# Patient Record
Sex: MALE | URBAN - NONMETROPOLITAN AREA
[De-identification: names, ages, dates, MRNs, and addresses within clinical notes are randomized per-mention and may not be internally consistent; named-entity substitution may affect disease eponyms.]

---

## 2018-03-29 PROBLEM — H52.11: Noted: 2018-03-29

## 2018-03-29 PROBLEM — H52.222: Noted: 2018-03-29

## 2018-03-29 PROBLEM — H52.4: Noted: 2018-03-29

## 2018-09-26 NOTE — PATIENT DISCUSSION
AMD (DRY), OD: NEWLY DIAGNOSED TODAY. PRESCRIBE AREDS 2 VITAMINS / AMSLER GRID DAILY / UV PROTECTION. SMOKING AVOIDANCE ADVISED. RETURN FOR FOLLOW-UP AS SCHEDULED. REFER TO DR. Elvin Raines.

## 2019-03-14 NOTE — PATIENT DISCUSSION
Discussed pathophysiology of drusen and possibility of development of further macular changes that may ultimately constitute macular degeneration. Due to few number of small drusen present, vitamin supplementation would be considered optional, without proven benefit at this stage. Avoid smoking. Eat a diet rich in vegetable and use sunglasses for UV protection when outdoors.

## 2019-03-14 NOTE — PATIENT DISCUSSION
MACULAR DRUSEN, OD: NO DEFINITE SIGNS OF AMD OU; RECOMMEND OBSERVATION ONLY AT THIS TIME YEARLY DILATED EXAM WITH DR CAT. RETURN TO DR Elmo Purvis AS NEEDED.

## 2019-10-23 NOTE — PATIENT DISCUSSION
DERMATOCHALASIS / PTOSIS RUL AND JUVENAL :  VISUALLY SIGNIFICANT TO PATIENT. SCHEDULE WITH OCULOPLASTIC SPECIALIST IF PATIENT DESIRES.

## 2020-10-28 NOTE — PATIENT DISCUSSION
**MILD TO MODERATE DRY EYE, OU: PRESCRIBED ARTIFICIAL TEARS 2-3 X A DAY OU. CONTINUE OTC ALLERGY DROP PRN FOR ANY ITCHING. RECOMMENDS OMEGA-3 FISH OIL WITH PRIMARY CARE PHYSICIANS APPROVAL. RETURN FOR FOLLOW-UP AS SCHEDULED OR SOONER IF SYMPTOMS WORSEN.

## 2021-06-18 NOTE — PATIENT DISCUSSION
6/18/2021 at 0850 a.m. right chest implanted port accessed using a 20 gauge 3/4 inch non-coring needle primed with 0.9% sodium chloride.  Good blood return obtained.  Blood obtained and sent to lab. Flushed with 20ml 0.9% sodium chloride. Implanted port site is not swollen and not warm.  Patient tolerated procedure well. Pt discharged to RN.     MACULAR DRUSEN, OU: CONTINUE AREDS2 AND INCREASED UV PROTECTION. STRESS GOOD DIET AND NO SMOKING. RETURN FOR FOLLOW-UP AS SCHEDULED.

## 2021-09-08 ENCOUNTER — IMPORTED ENCOUNTER (OUTPATIENT)
Dept: URBAN - NONMETROPOLITAN AREA CLINIC 1 | Facility: CLINIC | Age: 56
End: 2021-09-08

## 2021-09-08 PROCEDURE — 92004 COMPRE OPH EXAM NEW PT 1/>: CPT

## 2021-09-08 PROCEDURE — 92015 DETERMINE REFRACTIVE STATE: CPT

## 2021-09-08 NOTE — PATIENT DISCUSSION
Myopia-Discussed diagnosis with patient. -Explained that people who are myopic are at a higher risk for developing RD/RT and reviewed associated S&S.-Pt to contact our office if symptoms develop. Updated spec Rx given. Recommend lens that will provide comfort as well as protect safety and health of eyes. Presbyopia-Discussed diagnosis with patient.

## 2021-11-08 NOTE — PATIENT DISCUSSION
MACULAR DRUSEN, OU: CONTINUE AREDS2 AND INCREASED UV PROTECTION. STRESS GOOD DIET AND NO SMOKING. RETURN FOR FOLLOW-UP AS SCHEDULED.

## 2021-11-08 NOTE — PATIENT DISCUSSION
Dermatochalasis/ Ptosis Counseling: I have explained the diagnosis of dermatochalasis/ptosis to the patient. The resulting symptoms, including but not limited to visual field deficits, blepharitis, and/or dry eye symptoms from eyelid wick syndrome, that may result if left untreated were discussed with the patient. The patient understands that intervention is elective surgery and consultation with an oculoplastic specialist can be scheduled at their convenience.

## 2021-11-08 NOTE — PATIENT DISCUSSION
Dry Eye Syndrome Counseling: I have discussed the diagnosis and the pathophysiology of this disease with the patient. Vision may be limited by dry eye, and symptoms exacerbated by environmental factors such as smoke, wind, or prolonged eye use. Treatment options include, but are not limited to, artificial tears, punctal plugs, topical cyclosporine, oral omega-3 supplements, Lipiflow, moisture goggles, and lubricating ointments. I stressed the importance of compliance with treatment.

## 2021-11-08 NOTE — PATIENT DISCUSSION
MILD TO MODERATE DRY EYE, OU: PRESCRIBED ARTIFICIAL TEARS 2-3 X A DAY OU. CONTINUE OTC ALLERGY DROP PRN FOR ANY ITCHING. RECOMMENDS OMEGA-3 FISH OIL WITH PRIMARY CARE PHYSICIANS APPROVAL. RETURN FOR FOLLOW-UP AS SCHEDULED OR SOONER IF SYMPTOMS WORSEN.

## 2022-04-10 ASSESSMENT — TONOMETRY
OS_IOP_MMHG: 16
OD_IOP_MMHG: 16

## 2022-04-10 ASSESSMENT — VISUAL ACUITY
OU_CC: 20/25-1
OS_SC: 20/30
OD_CC: 20/40
OU_SC: 20/30
OS_CC: 20/25+3
OD_SC: 20/30

## 2023-04-26 ENCOUNTER — OFFICE VISIT (OUTPATIENT)
Dept: PRIMARY CARE CLINIC | Age: 58
End: 2023-04-26
Payer: COMMERCIAL

## 2023-04-26 VITALS
HEART RATE: 70 BPM | WEIGHT: 245 LBS | DIASTOLIC BLOOD PRESSURE: 90 MMHG | SYSTOLIC BLOOD PRESSURE: 132 MMHG | OXYGEN SATURATION: 100 % | HEIGHT: 77 IN | RESPIRATION RATE: 16 BRPM | BODY MASS INDEX: 28.93 KG/M2 | TEMPERATURE: 97.5 F

## 2023-04-26 DIAGNOSIS — Z12.5 SCREENING FOR MALIGNANT NEOPLASM OF PROSTATE: Primary | ICD-10-CM

## 2023-04-26 DIAGNOSIS — R03.0 ELEVATED BLOOD PRESSURE READING: ICD-10-CM

## 2023-04-26 DIAGNOSIS — Z13.1 SCREENING FOR DIABETES MELLITUS: ICD-10-CM

## 2023-04-26 DIAGNOSIS — Z13.220 SCREENING FOR HYPERLIPIDEMIA: ICD-10-CM

## 2023-04-26 PROCEDURE — 99203 OFFICE O/P NEW LOW 30 MIN: CPT | Performed by: FAMILY MEDICINE

## 2023-04-26 NOTE — PROGRESS NOTES
Chief Complaint   Patient presents with    Establish Care       There were no vitals taken for this visit. 1. Have you been to the ER, urgent care clinic since your last visit? Hospitalized since your last visit? No    2. Have you seen or consulted any other health care providers outside of the [address] [place] [address] since your last visit? Include any pap smears or colon screening. No      3. For patients aged 39-70: Has the patient had a colonoscopy / FIT/ Cologuard? Yes - Care Gap present. Rooming MA/LPN to request most recent results      If the patient is female:    4. For patients aged 41-77: Has the patient had a mammogram within the past 2 years? NA - based on age or sex      11. For patients aged 21-65: Has the patient had a pap smear?  NA - based on age or sex

## 2023-04-26 NOTE — PROGRESS NOTES
HPI     Chief Complaint   Patient presents with    [facility] Care     He is a 65 y.o. male who presents to [facility]. Establishing Care    Pmhx : BPH. Surghx reviewed. Fhx reviewed. Sochx : denies tobacco, drug or alcohol use. Diet is average. He has cut out red meat. He is not exercising regularly, but plans to restart this. Enjoys playing basketball on the weekend. Tolerates exercise well. Bph. Follows with urology. Notes home bp have been mildly elevated, DBP has been trending about 90 at home. - Chronic medical problems:  Past Medical History:   Diagnosis Date    BPH with obstruction/lower urinary tract symptoms     Elevated PSA     Nocturia     Slowing of urinary stream     Sterilization consult     Urgency of urination      - Current medications:   Current Outpatient Medications   Medication Sig    cholecalciferol (VITAMIN D3) (1000 Units /25 mcg) tablet Take 1 Tablet by mouth daily. therapeutic multivitamin (THERAGRAN) tablet Take 1 Tablet by mouth daily. tamsulosin (FLOMAX) 0.4 mg capsule Take 1 Capsule by mouth daily (after dinner). Indications: enlarged prostate with urination problem    omega 3-dha-epa-fish oil 60- mg cap Take 500 mg by mouth daily. (Patient not taking: Reported on 4/26/2023)     No current facility-administered medications for this visit.      - Family history:   Family History   Problem Relation Age of Onset    Mult Sclerosis Mother      - Allergies: No Known Allergies  - Surgical history:   Past Surgical History:   Procedure Laterality Date    HX COLONOSCOPY      HX OTHER SURGICAL  09/01/2019    Incision & drainage hematoma (Left) Procedure: INCISION AND DRAINAGE DEEP HEMATOMA UPPER ARM/ ELBOW;  Surgeon: Marleen Vegas MD    HX VASECTOMY  5-2008     - Social history (sexually active, occupation, smoker, etoh use, etc):   Social History     Socioeconomic History    Marital status:      Spouse name: Not on file    Number of children: Not on file    Years of education: Not on file    Highest education level: Not on file   Occupational History    Not on file   Tobacco Use    Smoking status: Never    Smokeless tobacco: Never   Substance and Sexual Activity    Alcohol use: Not Currently    Drug use: Never    Sexual activity: Yes     Partners: Female   Other Topics Concern    Not on file   Social History Narrative    Not on file     Social Determinants of Health     Financial Resource Strain: Unknown    Difficulty of Paying Living Expenses: Patient refused   Food Insecurity: Unknown    Worried About Running Out of Food in the Last Year: Patient refused    Ran Out of Food in the Last Year: Patient refused   Transportation Needs: Not on file   Physical Activity: Insufficiently Active    Days of Exercise per Week: 5 days    Minutes of Exercise per Session: 20 min   Stress: Not on file   Social Connections: Not on file   Intimate Partner Violence: Not At Risk    Fear of Current or Ex-Partner: No    Emotionally Abused: No    Physically Abused: No    Sexually Abused: No   Housing Stability: Not on file         Review of Systems  General : Denies fever, chills, unintentional weight loss. Cardiac : Denies chest pain, shortness of breath, orthopnea, PND. Pulm : Denies coughing, hemoptysis, dyspnea. GI: Denies abd pain, vomiting, change in bowel movements, black/bloody stool. Neuro : Denies syncope or presyncope. Denies focal neuro symptoms. Reviewed PmHx, RxHx, FmHx, SocHx, AllgHx and updated and dated in the chart. Physical Exam:  Visit Vitals  BP (!) 132/90 (BP 1 Location: Left upper arm, BP Patient Position: Sitting)   Pulse 70   Temp 97.5 °F (36.4 °C) (Temporal)   Resp 16   Ht 6' 5\" (1.956 m)   Wt 245 lb (111.1 kg)   SpO2 100%   BMI 29.05 kg/m²     Physical Exam  Vitals reviewed. Constitutional:       Appearance: Normal appearance. HENT:      Head: Normocephalic and atraumatic.       Right Ear: Tympanic membrane, ear canal and external ear normal.      Left Ear: Tympanic membrane, ear canal and external ear normal.      Mouth/Throat:      Mouth: Mucous membranes are moist.      Pharynx: Oropharynx is clear. Eyes:      Conjunctiva/sclera: Conjunctivae normal.      Pupils: Pupils are equal, round, and reactive to light. Cardiovascular:      Rate and Rhythm: Normal rate and regular rhythm. Pulses: Normal pulses. Heart sounds: Normal heart sounds. Pulmonary:      Effort: Pulmonary effort is normal.      Breath sounds: Normal breath sounds. Abdominal:      General: Bowel sounds are normal. There is no distension. Palpations: Abdomen is soft. There is no mass. Tenderness: There is no abdominal tenderness. Musculoskeletal:      Right lower leg: No edema. Left lower leg: No edema. Skin:     General: Skin is warm and dry. Neurological:      General: No focal deficit present. Mental Status: He is alert and oriented to person, place, and time. Psychiatric:         Mood and Affect: Mood normal.         Behavior: Behavior normal.         Thought Content: Thought content normal.          Assessment / Plan     Diagnoses and all orders for this visit:    1. Screening for malignant neoplasm of prostate  -     PSA SCREENING (SCREENING); Future    2. Screening for hyperlipidemia  -     METABOLIC PANEL, COMPREHENSIVE; Future  -     LIPID PANEL; Future    3. Screening for diabetes mellitus  -     METABOLIC PANEL, COMPREHENSIVE; Future    4. Elevated blood pressure reading  -     METABOLIC PANEL, COMPREHENSIVE; Future  -     LIPID PANEL; Future    Encouraged hh diet and regular exercise. Monitor bp at home. Bp goals discussed. He prefers to work on lifestyle modifications and monitor bp at home. Follow up if not controlled within 1 month. Advised shingles vaccine at local pharmacy. Request colonoscopy report; reports hx of normal screening colonoscopy but can't remember date.        Obtain medical records    I have discussed the diagnosis with the patient and the intended plan as seen in the above orders. I have discussed medication side effects and warnings with the patient as well.     Zenia Vital, DO

## 2023-04-27 LAB
ALBUMIN SERPL-MCNC: 4.5 G/DL (ref 3.8–4.9)
ALBUMIN/GLOB SERPL: 1.9 {RATIO} (ref 1.2–2.2)
ALP SERPL-CCNC: 49 IU/L (ref 44–121)
ALT SERPL-CCNC: 27 IU/L (ref 0–44)
AST SERPL-CCNC: 22 IU/L (ref 0–40)
BILIRUB SERPL-MCNC: 1 MG/DL (ref 0–1.2)
BUN SERPL-MCNC: 18 MG/DL (ref 6–24)
BUN/CREAT SERPL: 17 (ref 9–20)
CALCIUM SERPL-MCNC: 9.6 MG/DL (ref 8.7–10.2)
CHLORIDE SERPL-SCNC: 105 MMOL/L (ref 96–106)
CHOLEST SERPL-MCNC: 167 MG/DL (ref 100–199)
CO2 SERPL-SCNC: 25 MMOL/L (ref 20–29)
CREAT SERPL-MCNC: 1.09 MG/DL (ref 0.76–1.27)
EGFRCR SERPLBLD CKD-EPI 2021: 80 ML/MIN/1.73
GLOBULIN SER CALC-MCNC: 2.4 G/DL (ref 1.5–4.5)
GLUCOSE SERPL-MCNC: 103 MG/DL (ref 70–99)
HDLC SERPL-MCNC: 49 MG/DL
LDLC SERPL CALC-MCNC: 106 MG/DL (ref 0–99)
POTASSIUM SERPL-SCNC: 4.3 MMOL/L (ref 3.5–5.2)
PROT SERPL-MCNC: 6.9 G/DL (ref 6–8.5)
PSA SERPL-MCNC: 3.4 NG/ML (ref 0–4)
SODIUM SERPL-SCNC: 144 MMOL/L (ref 134–144)
TRIGL SERPL-MCNC: 61 MG/DL (ref 0–149)
VLDLC SERPL CALC-MCNC: 12 MG/DL (ref 5–40)

## 2023-05-17 ENCOUNTER — TELEPHONE (OUTPATIENT)
Dept: PRIMARY CARE CLINIC | Facility: CLINIC | Age: 58
End: 2023-05-17

## 2023-05-17 NOTE — TELEPHONE ENCOUNTER
Patient says he and Dr Ngoc Sofia talked about taking Flomax when he saw her but the prescription was never ordered. He needs Flomax and twice a day. Please send to [facility].

## 2023-05-18 RX ORDER — TAMSULOSIN HYDROCHLORIDE 0.4 MG/1
0.4 CAPSULE ORAL 2 TIMES DAILY
Qty: 180 CAPSULE | Refills: 1 | Status: SHIPPED | OUTPATIENT
Start: 2023-05-18

## 2023-05-18 RX ORDER — TAMSULOSIN HYDROCHLORIDE 0.4 MG/1
0.4 CAPSULE ORAL 2 TIMES DAILY
Qty: 180 CAPSULE | Refills: 1 | Status: SHIPPED | OUTPATIENT
Start: 2023-05-18 | End: 2023-05-18 | Stop reason: SDUPTHER

## 2023-11-14 RX ORDER — TAMSULOSIN HYDROCHLORIDE 0.4 MG/1
CAPSULE ORAL
Qty: 180 CAPSULE | Refills: 0 | Status: SHIPPED | OUTPATIENT
Start: 2023-11-14

## 2024-03-11 RX ORDER — TAMSULOSIN HYDROCHLORIDE 0.4 MG/1
CAPSULE ORAL
Qty: 180 CAPSULE | Refills: 0 | Status: SHIPPED | OUTPATIENT
Start: 2024-03-11

## 2024-05-09 ENCOUNTER — COMMUNITY OUTREACH (OUTPATIENT)
Dept: PRIMARY CARE CLINIC | Facility: CLINIC | Age: 59
End: 2024-05-09

## 2024-06-06 DIAGNOSIS — Z12.5 ENCOUNTER FOR SCREENING FOR MALIGNANT NEOPLASM OF PROSTATE: Primary | ICD-10-CM

## 2024-06-06 RX ORDER — TAMSULOSIN HYDROCHLORIDE 0.4 MG/1
CAPSULE ORAL
Qty: 180 CAPSULE | Refills: 0 | OUTPATIENT
Start: 2024-06-06

## 2024-06-06 RX ORDER — TAMSULOSIN HYDROCHLORIDE 0.4 MG/1
CAPSULE ORAL
Qty: 60 CAPSULE | Refills: 0 | Status: SHIPPED | OUTPATIENT
Start: 2024-06-06

## 2024-06-06 NOTE — TELEPHONE ENCOUNTER
Patient calling about his tamsulosin being denied do to appointment. Explain to the patient that I can scheduled him appointment and see if Dr. Kirk will send in short supply until his appointment with her on June 28th. Patient would like a phone call if medication can be refill until appointment.

## 2024-06-28 ENCOUNTER — OFFICE VISIT (OUTPATIENT)
Dept: PRIMARY CARE CLINIC | Facility: CLINIC | Age: 59
End: 2024-06-28
Payer: COMMERCIAL

## 2024-06-28 VITALS
WEIGHT: 238 LBS | BODY MASS INDEX: 28.1 KG/M2 | TEMPERATURE: 97.1 F | DIASTOLIC BLOOD PRESSURE: 85 MMHG | OXYGEN SATURATION: 96 % | HEIGHT: 77 IN | SYSTOLIC BLOOD PRESSURE: 121 MMHG | RESPIRATION RATE: 12 BRPM | HEART RATE: 61 BPM

## 2024-06-28 DIAGNOSIS — E78.2 MIXED HYPERLIPIDEMIA: ICD-10-CM

## 2024-06-28 DIAGNOSIS — M54.42 CHRONIC MIDLINE LOW BACK PAIN WITH LEFT-SIDED SCIATICA: ICD-10-CM

## 2024-06-28 DIAGNOSIS — R73.9 ELEVATED BLOOD SUGAR: ICD-10-CM

## 2024-06-28 DIAGNOSIS — G89.29 CHRONIC MIDLINE LOW BACK PAIN WITH LEFT-SIDED SCIATICA: ICD-10-CM

## 2024-06-28 DIAGNOSIS — R20.2 PARESTHESIA: ICD-10-CM

## 2024-06-28 DIAGNOSIS — Z00.00 ROUTINE GENERAL MEDICAL EXAMINATION AT A HEALTH CARE FACILITY: ICD-10-CM

## 2024-06-28 DIAGNOSIS — Z12.11 SCREEN FOR COLON CANCER: Primary | ICD-10-CM

## 2024-06-28 PROCEDURE — 99396 PREV VISIT EST AGE 40-64: CPT | Performed by: FAMILY MEDICINE

## 2024-06-28 RX ORDER — TRIAMCINOLONE ACETONIDE 1 MG/G
OINTMENT TOPICAL 2 TIMES DAILY
Qty: 15 G | Refills: 0 | Status: SHIPPED | OUTPATIENT
Start: 2024-06-28 | End: 2024-07-05

## 2024-06-28 RX ORDER — VIT B COMP NO.3/FOLIC/C/BIOTIN 1 MG-60 MG
1 TABLET ORAL
COMMUNITY

## 2024-06-28 SDOH — ECONOMIC STABILITY: FOOD INSECURITY: WITHIN THE PAST 12 MONTHS, YOU WORRIED THAT YOUR FOOD WOULD RUN OUT BEFORE YOU GOT MONEY TO BUY MORE.: NEVER TRUE

## 2024-06-28 SDOH — ECONOMIC STABILITY: HOUSING INSECURITY
IN THE LAST 12 MONTHS, WAS THERE A TIME WHEN YOU DID NOT HAVE A STEADY PLACE TO SLEEP OR SLEPT IN A SHELTER (INCLUDING NOW)?: NO

## 2024-06-28 SDOH — ECONOMIC STABILITY: FOOD INSECURITY: WITHIN THE PAST 12 MONTHS, THE FOOD YOU BOUGHT JUST DIDN'T LAST AND YOU DIDN'T HAVE MONEY TO GET MORE.: NEVER TRUE

## 2024-06-28 SDOH — ECONOMIC STABILITY: INCOME INSECURITY: HOW HARD IS IT FOR YOU TO PAY FOR THE VERY BASICS LIKE FOOD, HOUSING, MEDICAL CARE, AND HEATING?: NOT HARD AT ALL

## 2024-06-28 ASSESSMENT — PATIENT HEALTH QUESTIONNAIRE - PHQ9
SUM OF ALL RESPONSES TO PHQ QUESTIONS 1-9: 0
SUM OF ALL RESPONSES TO PHQ QUESTIONS 1-9: 0
2. FEELING DOWN, DEPRESSED OR HOPELESS: NOT AT ALL
SUM OF ALL RESPONSES TO PHQ QUESTIONS 1-9: 0
SUM OF ALL RESPONSES TO PHQ QUESTIONS 1-9: 0
SUM OF ALL RESPONSES TO PHQ9 QUESTIONS 1 & 2: 0
1. LITTLE INTEREST OR PLEASURE IN DOING THINGS: NOT AT ALL

## 2024-06-28 NOTE — PROGRESS NOTES
\"Have you been to the ER, urgent care clinic since your last visit?  Hospitalized since your last visit?\"    NO    “Have you seen or consulted any other health care providers outside of [facility] since your last visit?”    NO      “Have you had a colorectal cancer screening such as a colonoscopy/FIT/Cologuard?    NO pt states that he is unsure but it has been over 5 years it was in [place]    No colonoscopy on file  No cologuard on file  No FIT/FOBT on file   No flexible sigmoidoscopy on file         Click Here for Release of Records Request   
agrees with the plan. I have discussed medication side effects and warnings with the patient as well. Informed patient to return to the office if new symptoms arise.        Sally Kirk, DO

## 2024-06-29 LAB
ALBUMIN SERPL-MCNC: 4.4 G/DL (ref 3.8–4.9)
ALP SERPL-CCNC: 50 IU/L (ref 44–121)
ALT SERPL-CCNC: 20 IU/L (ref 0–44)
AST SERPL-CCNC: 21 IU/L (ref 0–40)
BILIRUB SERPL-MCNC: 1.2 MG/DL (ref 0–1.2)
BUN SERPL-MCNC: 10 MG/DL (ref 6–24)
BUN/CREAT SERPL: 9 (ref 9–20)
CALCIUM SERPL-MCNC: 9.1 MG/DL (ref 8.7–10.2)
CHLORIDE SERPL-SCNC: 105 MMOL/L (ref 96–106)
CHOLEST SERPL-MCNC: 154 MG/DL (ref 100–199)
CO2 SERPL-SCNC: 21 MMOL/L (ref 20–29)
CREAT SERPL-MCNC: 1.17 MG/DL (ref 0.76–1.27)
EGFRCR SERPLBLD CKD-EPI 2021: 73 ML/MIN/1.73
GLOBULIN SER CALC-MCNC: 2.5 G/DL (ref 1.5–4.5)
GLUCOSE SERPL-MCNC: 88 MG/DL (ref 70–99)
HBA1C MFR BLD: 6.1 % (ref 4.8–5.6)
HDLC SERPL-MCNC: 44 MG/DL
LDLC SERPL CALC-MCNC: 99 MG/DL (ref 0–99)
POTASSIUM SERPL-SCNC: 3.9 MMOL/L (ref 3.5–5.2)
PROT SERPL-MCNC: 6.9 G/DL (ref 6–8.5)
SODIUM SERPL-SCNC: 141 MMOL/L (ref 134–144)
TRIGL SERPL-MCNC: 50 MG/DL (ref 0–149)
TSH SERPL DL<=0.005 MIU/L-ACNC: 1.32 UIU/ML (ref 0.45–4.5)
VIT B12 SERPL-MCNC: 650 PG/ML (ref 232–1245)
VLDLC SERPL CALC-MCNC: 11 MG/DL (ref 5–40)

## 2024-07-04 DIAGNOSIS — Z12.5 ENCOUNTER FOR SCREENING FOR MALIGNANT NEOPLASM OF PROSTATE: ICD-10-CM

## 2024-07-07 RX ORDER — TAMSULOSIN HYDROCHLORIDE 0.4 MG/1
CAPSULE ORAL
Qty: 180 CAPSULE | Refills: 1 | Status: SHIPPED | OUTPATIENT
Start: 2024-07-07

## 2024-07-18 ENCOUNTER — OFFICE VISIT (OUTPATIENT)
Dept: PRIMARY CARE CLINIC | Facility: CLINIC | Age: 59
End: 2024-07-18
Payer: COMMERCIAL

## 2024-07-18 VITALS
TEMPERATURE: 97.5 F | WEIGHT: 238 LBS | HEART RATE: 74 BPM | OXYGEN SATURATION: 95 % | RESPIRATION RATE: 16 BRPM | SYSTOLIC BLOOD PRESSURE: 126 MMHG | DIASTOLIC BLOOD PRESSURE: 86 MMHG | HEIGHT: 77 IN | BODY MASS INDEX: 28.1 KG/M2

## 2024-07-18 DIAGNOSIS — R73.02 IGT (IMPAIRED GLUCOSE TOLERANCE): Primary | ICD-10-CM

## 2024-07-18 DIAGNOSIS — R73.02 IGT (IMPAIRED GLUCOSE TOLERANCE): ICD-10-CM

## 2024-07-18 DIAGNOSIS — L73.9 FOLLICULITIS: ICD-10-CM

## 2024-07-18 PROCEDURE — 99213 OFFICE O/P EST LOW 20 MIN: CPT | Performed by: FAMILY MEDICINE

## 2024-07-18 RX ORDER — CEPHALEXIN 250 MG/1
250 CAPSULE ORAL 4 TIMES DAILY
Qty: 28 CAPSULE | Refills: 0 | Status: SHIPPED | OUTPATIENT
Start: 2024-07-18

## 2024-07-18 ASSESSMENT — PATIENT HEALTH QUESTIONNAIRE - PHQ9
SUM OF ALL RESPONSES TO PHQ QUESTIONS 1-9: 0
1. LITTLE INTEREST OR PLEASURE IN DOING THINGS: NOT AT ALL
SUM OF ALL RESPONSES TO PHQ9 QUESTIONS 1 & 2: 0
SUM OF ALL RESPONSES TO PHQ QUESTIONS 1-9: 0
2. FEELING DOWN, DEPRESSED OR HOPELESS: NOT AT ALL

## 2024-07-18 NOTE — PROGRESS NOTES
Subjective  Michael Pugh is an 58 y.o. male who presents for wellness exam.     Pmhx : BPH. IGT    Bph. Follows with urology.     C/o chronic paresthesias in toes on both feet. Nothing seems to precipitate or relieve this.   Also sometimes has some chronic intermittent back pains.     Last labs indicating IGT.  He's started walking daily for exercise and does smoke weight lifting. Plays basketball regularly.   No exercise intolerance.     Hemoglobin A1C   Date Value Ref Range Status   06/28/2024 6.1 (H) 4.8 - 5.6 % Final     Comment:                 Prediabetes: 5.7 - 6.4           Diabetes: >6.4           Glycemic control for adults with diabetes: <7.0       C/o skin lesions under his chin in the past 2 weeks. Tender. Onset after going to a up.     Allergies - reviewed:   No Known Allergies      Medications - reviewed:   Current Outpatient Medications   Medication Sig    tamsulosin (FLOMAX) 0.4 MG capsule TAKE ONE CAPSULE BY MOUTH TWICE A DAY (IN THE MORNING AND AT BEDTIME)    Omega-3 Fatty Acids (FISH OIL PO) Take by mouth    B complex-vitamin C-folic acid (NEPHRO-TIMOTHY) 1 MG tablet Take 1 tablet by mouth daily (with breakfast)    vitamin D (CHOLECALCIFEROL) 25 MCG (1000 UT) TABS tablet Take 1 tablet by mouth daily     No current facility-administered medications for this visit.         Past Medical History - reviewed:  Past Medical History:   Diagnosis Date    BPH with obstruction/lower urinary tract symptoms     Elevated PSA     Nocturia     Slowing of urinary stream     Sterilization consult     Urgency of urination          Past Surgical History - reviewed:   Past Surgical History:   Procedure Laterality Date    COLONOSCOPY      ELBOW FRACTURE SURGERY  8/2019    OTHER SURGICAL HISTORY  09/01/2019    Incision & drainage hematoma (Left) Procedure: INCISION AND DRAINAGE DEEP HEMATOMA UPPER ARM/ ELBOW;  Surgeon: Frank Louis IV, MD    VASECTOMY  5-2008     ROS  CONSTITUTIONAL: Denies fever, chills,

## 2024-07-18 NOTE — PROGRESS NOTES
\"Have you been to the ER, urgent care clinic since your last visit?  Hospitalized since your last visit?\"    NO    “Have you seen or consulted any other health care providers outside of [facility] since your last visit?”    NO      “Have you had a colorectal cancer screening such as a colonoscopy/FIT/Cologuard?    No but has one scheduled    No colonoscopy on file  No cologuard on file  No FIT/FOBT on file   No flexible sigmoidoscopy on file         Click Here for Release of Records Request

## 2024-09-16 ENCOUNTER — TELEPHONE (OUTPATIENT)
Dept: PRIMARY CARE CLINIC | Facility: CLINIC | Age: 59
End: 2024-09-16

## 2024-09-16 NOTE — TELEPHONE ENCOUNTER
----- Message from Clarence PERRY sent at 9/13/2024  4:02 PM EDT -----  Regarding: [facility] Appointment Request  [facility] Appointment Request    Patient needs appointment for [facility] Appointment Type: New to Provider.    Patient Requested Dates(s):              June 2025 (Monday or Friday)  Patient Requested Time:                    Morning schedule preferably 8AM   Provider Name:                                  DR. Nevaeh Mijares, DO    Reason for Appointment Request: Other/Switch PCP   --------------------------------------------------------------------------------------------------------------------------    Relationship to Patient: Self     Call Back Information: OK to leave message on [employer]  Preferred Call Back Number:  [phone]        NOTE: PCP will be leaving at the practice next month.

## 2025-01-01 LAB
BUN SERPL-MCNC: 13 MG/DL (ref 6–24)
BUN/CREAT SERPL: 13 (ref 9–20)
CALCIUM SERPL-MCNC: 9.1 MG/DL (ref 8.7–10.2)
CHLORIDE SERPL-SCNC: 105 MMOL/L (ref 96–106)
CO2 SERPL-SCNC: 22 MMOL/L (ref 20–29)
CREAT SERPL-MCNC: 0.99 MG/DL (ref 0.76–1.27)
EGFRCR SERPLBLD CKD-EPI 2021: 88 ML/MIN/1.73
GLUCOSE SERPL-MCNC: 102 MG/DL (ref 70–99)
HBA1C MFR BLD: 6 % (ref 4.8–5.6)
POTASSIUM SERPL-SCNC: 4.1 MMOL/L (ref 3.5–5.2)
SODIUM SERPL-SCNC: 141 MMOL/L (ref 134–144)

## 2025-01-07 DIAGNOSIS — Z12.5 ENCOUNTER FOR SCREENING FOR MALIGNANT NEOPLASM OF PROSTATE: ICD-10-CM

## 2025-01-07 RX ORDER — TAMSULOSIN HYDROCHLORIDE 0.4 MG/1
CAPSULE ORAL
Qty: 180 CAPSULE | Refills: 0 | Status: SHIPPED | OUTPATIENT
Start: 2025-01-07

## 2025-04-14 DIAGNOSIS — Z12.5 ENCOUNTER FOR SCREENING FOR MALIGNANT NEOPLASM OF PROSTATE: ICD-10-CM

## 2025-04-14 RX ORDER — TAMSULOSIN HYDROCHLORIDE 0.4 MG/1
0.4 CAPSULE ORAL 2 TIMES DAILY
Qty: 120 CAPSULE | Refills: 0 | Status: SHIPPED | OUTPATIENT
Start: 2025-04-14

## 2025-04-14 NOTE — TELEPHONE ENCOUNTER
Patient calling to request a refill of his flomax to be sent to the [id number] [place], [place] - [address] - P [phone] - F [phone] Patient has appointment scheduled for June 27 with Dr. Mijares

## 2025-06-25 SDOH — HEALTH STABILITY: PHYSICAL HEALTH: ON AVERAGE, HOW MANY DAYS PER WEEK DO YOU ENGAGE IN MODERATE TO STRENUOUS EXERCISE (LIKE A BRISK WALK)?: 4 DAYS

## 2025-06-25 SDOH — HEALTH STABILITY: PHYSICAL HEALTH: ON AVERAGE, HOW MANY MINUTES DO YOU ENGAGE IN EXERCISE AT THIS LEVEL?: 30 MIN

## 2025-06-27 ENCOUNTER — OFFICE VISIT (OUTPATIENT)
Dept: PRIMARY CARE CLINIC | Facility: CLINIC | Age: 60
End: 2025-06-27
Payer: COMMERCIAL

## 2025-06-27 VITALS
WEIGHT: 239 LBS | OXYGEN SATURATION: 97 % | DIASTOLIC BLOOD PRESSURE: 86 MMHG | BODY MASS INDEX: 28.22 KG/M2 | TEMPERATURE: 97.8 F | HEIGHT: 77 IN | HEART RATE: 74 BPM | RESPIRATION RATE: 18 BRPM | SYSTOLIC BLOOD PRESSURE: 123 MMHG

## 2025-06-27 DIAGNOSIS — Z00.00 VISIT FOR WELL MAN HEALTH CHECK: Primary | ICD-10-CM

## 2025-06-27 DIAGNOSIS — R20.2 NUMBNESS AND TINGLING OF BOTH FEET: ICD-10-CM

## 2025-06-27 DIAGNOSIS — R20.0 NUMBNESS AND TINGLING OF BOTH FEET: ICD-10-CM

## 2025-06-27 DIAGNOSIS — N40.0 BENIGN PROSTATIC HYPERPLASIA, UNSPECIFIED WHETHER LOWER URINARY TRACT SYMPTOMS PRESENT: ICD-10-CM

## 2025-06-27 DIAGNOSIS — Z12.5 SCREENING FOR PROSTATE CANCER: ICD-10-CM

## 2025-06-27 DIAGNOSIS — R73.03 PREDIABETES: ICD-10-CM

## 2025-06-27 PROCEDURE — 99214 OFFICE O/P EST MOD 30 MIN: CPT | Performed by: FAMILY MEDICINE

## 2025-06-27 PROCEDURE — 99396 PREV VISIT EST AGE 40-64: CPT | Performed by: FAMILY MEDICINE

## 2025-06-27 RX ORDER — TAMSULOSIN HYDROCHLORIDE 0.4 MG/1
0.4 CAPSULE ORAL 2 TIMES DAILY
Qty: 180 CAPSULE | Refills: 3 | Status: SHIPPED | OUTPATIENT
Start: 2025-06-27

## 2025-06-27 SDOH — ECONOMIC STABILITY: FOOD INSECURITY: WITHIN THE PAST 12 MONTHS, YOU WORRIED THAT YOUR FOOD WOULD RUN OUT BEFORE YOU GOT MONEY TO BUY MORE.: NEVER TRUE

## 2025-06-27 SDOH — ECONOMIC STABILITY: FOOD INSECURITY: WITHIN THE PAST 12 MONTHS, THE FOOD YOU BOUGHT JUST DIDN'T LAST AND YOU DIDN'T HAVE MONEY TO GET MORE.: NEVER TRUE

## 2025-06-27 ASSESSMENT — PATIENT HEALTH QUESTIONNAIRE - PHQ9
1. LITTLE INTEREST OR PLEASURE IN DOING THINGS: NOT AT ALL
SUM OF ALL RESPONSES TO PHQ QUESTIONS 1-9: 0
2. FEELING DOWN, DEPRESSED OR HOPELESS: NOT AT ALL
SUM OF ALL RESPONSES TO PHQ QUESTIONS 1-9: 0

## 2025-06-27 NOTE — PROGRESS NOTES
Health Decision Maker has been checked with the patient      AI form NOT signed    Chief Complaint   Patient presents with    New Patient     NTP       \"Have you been to the ER, urgent care clinic since your last visit?  Hospitalized since your last visit?\"    NO    “Have you seen or consulted any other health care providers outside of [facility] since your last visit?”    NO      Vitals:    06/27/25 0820   Resp: 18   Temp: 97.8 °F (36.6 °C)   TempSrc: Oral   SpO2: 99%   Weight: 108.4 kg (239 lb)   Height: 1.956 m (6' 5\")      Depression: Not at risk (6/27/2025)    PHQ-2     PHQ-2 Score: 0        “Have you had a colorectal cancer screening such as a colonoscopy/FIT/Cologuard?    YES - Type: Colonoscopy - Where: [place]  Upcoming in August  Nurse/CMA to request most recent records if not in the chart     No colonoscopy on file  No cologuard on file  No FIT/FOBT on file   No flexible sigmoidoscopy on file         Click Here for Release of Records Request    Chart reviewed: immunizations are documented.     There is no immunization history on file for this patient.

## 2025-06-27 NOTE — PROGRESS NOTES
HPI     Chief Complaint   Patient presents with    New Patient     NTP     He is a 59 y.o. male who presents for annual exam.     Takes Flomax for enlarged prostate. Symptoms are well controlled.     Normal diet  Goes to [employer] walks a few times a week  No tobacco, no alcohol  Declines std testing  Discussed vaccine  Colonoscopy done in [place], has appt in August 2025 to get another with [facility]     States he has tingling in both feet. States it feels numb. Going on for a while for about a year. Has a hx of sciatica and back problems. No saddle anesthesia/ urinary incontinence.     Reviewed PmHx, RxHx, FmHx, SocHx, AllgHx and updated and dated in the chart.    Physical Exam:  /86   Pulse 74   Temp 97.8 °F (36.6 °C) (Oral)   Resp 18   Ht 1.956 m (6' 5\")   Wt 108.4 kg (239 lb)   SpO2 97%   BMI 28.34 kg/m²   WNWD, NAD  RRR, no murmur  CTA, no wheezes/ ronchi/ rales  Abd soft, nttp  Tms nml, pharynx nml, nose nml  No cervical LAD  No edema  No focal deficits  Mood/ affect nml  Foot Exam:    Left: Filament test normal sensation   Pulse PT: 2+ (normal)   Deformities: None  Right: Filament test normal sensation   Pulse PT: 2+ (normal)   Deformities: None     No results found for this or any previous visit (from the past 12 hours).       Assessment / Plan     Michael was seen today for new patient.    Diagnoses and all orders for this visit:    Visit for well man health check  -     CBC; Future  -     Comprehensive Metabolic Panel; Future  -     Lipid Panel; Future    Screening for prostate cancer  -     PSA Screening; Future    Prediabetes  -     Hemoglobin A1C; Future    Numbness and tingling of both feet - Unclear etiology. Check labs including A1c to rule out worsening blood sugar. Hx of osteoarthritis of back. Lumbar Xray on file from 2024. No significant worsening of back symptoms. Referral to Podiatry for further eval if labs normal.   -     Vitamin B12; Future  -     Magnesium; Future  -     AFL -

## 2025-07-01 DIAGNOSIS — Z12.5 SCREENING FOR PROSTATE CANCER: ICD-10-CM

## 2025-07-01 DIAGNOSIS — R73.03 PREDIABETES: ICD-10-CM

## 2025-07-01 DIAGNOSIS — R20.0 NUMBNESS AND TINGLING OF BOTH FEET: ICD-10-CM

## 2025-07-01 DIAGNOSIS — Z00.00 VISIT FOR WELL MAN HEALTH CHECK: ICD-10-CM

## 2025-07-01 DIAGNOSIS — R20.2 NUMBNESS AND TINGLING OF BOTH FEET: ICD-10-CM

## 2025-07-03 ENCOUNTER — RESULTS FOLLOW-UP (OUTPATIENT)
Dept: PRIMARY CARE CLINIC | Facility: CLINIC | Age: 60
End: 2025-07-03

## 2025-07-03 LAB
ALBUMIN SERPL-MCNC: 4 G/DL (ref 3.5–5)
ALBUMIN/GLOB SERPL: 1.2 (ref 1.1–2.2)
ALP SERPL-CCNC: 57 U/L (ref 45–117)
ALT SERPL-CCNC: 33 U/L (ref 12–78)
ANION GAP SERPL CALC-SCNC: 4 MMOL/L (ref 2–12)
AST SERPL-CCNC: 16 U/L (ref 15–37)
BILIRUB SERPL-MCNC: 1.2 MG/DL (ref 0.2–1)
BUN SERPL-MCNC: 14 MG/DL (ref 6–20)
BUN/CREAT SERPL: 12 (ref 12–20)
CALCIUM SERPL-MCNC: 9.6 MG/DL (ref 8.5–10.1)
CHLORIDE SERPL-SCNC: 109 MMOL/L (ref 97–108)
CHOLEST SERPL-MCNC: 174 MG/DL
CO2 SERPL-SCNC: 27 MMOL/L (ref 21–32)
CREAT SERPL-MCNC: 1.19 MG/DL (ref 0.7–1.3)
ERYTHROCYTE [DISTWIDTH] IN BLOOD BY AUTOMATED COUNT: 13.8 % (ref 11.5–14.5)
EST. AVERAGE GLUCOSE BLD GHB EST-MCNC: 120 MG/DL
GLOBULIN SER CALC-MCNC: 3.3 G/DL (ref 2–4)
GLUCOSE SERPL-MCNC: 98 MG/DL (ref 65–100)
HBA1C MFR BLD: 5.8 % (ref 4–5.6)
HCT VFR BLD AUTO: 43.8 % (ref 36.6–50.3)
HDLC SERPL-MCNC: 51 MG/DL
HDLC SERPL: 3.4 (ref 0–5)
HGB BLD-MCNC: 14.4 G/DL (ref 12.1–17)
LDLC SERPL CALC-MCNC: 110.6 MG/DL (ref 0–100)
MAGNESIUM SERPL-MCNC: 2.1 MG/DL (ref 1.6–2.4)
MCH RBC QN AUTO: 26.9 PG (ref 26–34)
MCHC RBC AUTO-ENTMCNC: 32.9 G/DL (ref 30–36.5)
MCV RBC AUTO: 81.9 FL (ref 80–99)
NRBC # BLD: 0 K/UL (ref 0–0.01)
NRBC BLD-RTO: 0 PER 100 WBC
PLATELET # BLD AUTO: 156 K/UL (ref 150–400)
PMV BLD AUTO: 11.8 FL (ref 8.9–12.9)
POTASSIUM SERPL-SCNC: 4.4 MMOL/L (ref 3.5–5.1)
PROT SERPL-MCNC: 7.3 G/DL (ref 6.4–8.2)
PSA SERPL-MCNC: 5.6 NG/ML (ref 0.01–4)
RBC # BLD AUTO: 5.35 M/UL (ref 4.1–5.7)
SODIUM SERPL-SCNC: 140 MMOL/L (ref 136–145)
TRIGL SERPL-MCNC: 62 MG/DL
VIT B12 SERPL-MCNC: 607 PG/ML (ref 193–986)
VLDLC SERPL CALC-MCNC: 12.4 MG/DL
WBC # BLD AUTO: 6 K/UL (ref 4.1–11.1)